# Patient Record
Sex: MALE | Race: WHITE
[De-identification: names, ages, dates, MRNs, and addresses within clinical notes are randomized per-mention and may not be internally consistent; named-entity substitution may affect disease eponyms.]

---

## 2019-01-22 ENCOUNTER — HOSPITAL ENCOUNTER (OUTPATIENT)
Dept: HOSPITAL 80 - FCPNEURO | Age: 24
End: 2019-01-22
Attending: PSYCHIATRY & NEUROLOGY
Payer: MEDICAID

## 2019-01-22 DIAGNOSIS — R42: Primary | ICD-10-CM

## 2019-01-22 NOTE — CPEEG
4-HOUR VIDEO EEG



DATE OF STUDY:  01/22/2019



DATE OF INTERPRETATION:  January 22, 2019.





INTERPRETATION:  This 4-hour video EEG recording is normal.  There were no potentially epileptogenic 
abnormalities present in the awake or sleep recordings.  The patient did not have any clinical events
 during the video EEG monitoring session.



REPORT:  This 4-hour video EEG contains 10 Hz alpha activity over the posterior head regions.  There 
was no abnormal activation at rest, during photic stimulation or hyperventilation.  The patient becam
e drowsy and fell into sustained sleep during the study.  There was no abnormal activation during shayy
wsiness, sleep, or during times of arousal.  The patient did not have any clinical events during the 
video EEG monitoring session.





Job #:  [id number]